# Patient Record
Sex: MALE | URBAN - METROPOLITAN AREA
[De-identification: names, ages, dates, MRNs, and addresses within clinical notes are randomized per-mention and may not be internally consistent; named-entity substitution may affect disease eponyms.]

---

## 2017-06-03 ENCOUNTER — INPATIENT (INPATIENT)
Facility: HOSPITAL | Age: 52
LOS: 1 days | Discharge: HOME | DRG: 976 | End: 2017-06-05
Attending: INTERNAL MEDICINE | Admitting: INTERNAL MEDICINE
Payer: MEDICAID

## 2017-06-03 VITALS
RESPIRATION RATE: 18 BRPM | HEART RATE: 88 BPM | OXYGEN SATURATION: 99 % | DIASTOLIC BLOOD PRESSURE: 87 MMHG | SYSTOLIC BLOOD PRESSURE: 124 MMHG | TEMPERATURE: 98 F

## 2017-06-03 LAB — EXTRA SST TUBE: SIGNIFICANT CHANGE UP

## 2017-06-03 PROCEDURE — 99222 1ST HOSP IP/OBS MODERATE 55: CPT | Mod: GC

## 2017-06-03 PROCEDURE — 99285 EMERGENCY DEPT VISIT HI MDM: CPT

## 2017-06-03 RX ORDER — SODIUM CHLORIDE 9 MG/ML
1000 INJECTION INTRAMUSCULAR; INTRAVENOUS; SUBCUTANEOUS ONCE
Qty: 0 | Refills: 0 | Status: COMPLETED | OUTPATIENT
Start: 2017-06-03 | End: 2017-06-03

## 2017-06-03 RX ORDER — FAMOTIDINE 10 MG/ML
20 INJECTION INTRAVENOUS ONCE
Qty: 0 | Refills: 0 | Status: COMPLETED | OUTPATIENT
Start: 2017-06-03 | End: 2017-06-03

## 2017-06-03 RX ORDER — HEPARIN SODIUM 5000 [USP'U]/ML
5000 INJECTION INTRAVENOUS; SUBCUTANEOUS EVERY 8 HOURS
Qty: 0 | Refills: 0 | Status: DISCONTINUED | OUTPATIENT
Start: 2017-06-03 | End: 2017-06-05

## 2017-06-03 RX ORDER — ONDANSETRON 8 MG/1
4 TABLET, FILM COATED ORAL ONCE
Qty: 0 | Refills: 0 | Status: COMPLETED | OUTPATIENT
Start: 2017-06-03 | End: 2017-06-03

## 2017-06-03 RX ADMIN — SODIUM CHLORIDE 1000 MILLILITER(S): 9 INJECTION INTRAMUSCULAR; INTRAVENOUS; SUBCUTANEOUS at 22:37

## 2017-06-03 RX ADMIN — FAMOTIDINE 20 MILLIGRAM(S): 10 INJECTION INTRAVENOUS at 22:37

## 2017-06-03 RX ADMIN — ONDANSETRON 4 MILLIGRAM(S): 8 TABLET, FILM COATED ORAL at 22:38

## 2017-06-03 NOTE — ED ADULT NURSE NOTE - OBJECTIVE STATEMENT
pt. with hx of HIV presented with c/o "I have infections all over my body". Pt. states he has not taking his hiv meds for 7-8 months, pt. c/o diarrhea for 2 months, cough for a week, pain and rash to arms, pt. is A&Ox3, communicates w/o difficulty, red and white rash noted to both arms. pt. denies chest pain, shortness of breath, n/v, dizziness, weakness, fever, chills.

## 2017-06-03 NOTE — ED PROVIDER NOTE - GENITOURINARY, MLM
External genitalia is normal. External genitalia is normal.  right inguinal hernia reducible  no scrotal swelling  or masses

## 2017-06-03 NOTE — ED PROVIDER NOTE - CARE PLAN
Principal Discharge DX:	Diarrhea  Secondary Diagnosis:	HIV (human immunodeficiency virus infection) Principal Discharge DX:	Diarrhea  Secondary Diagnosis:	HIV (human immunodeficiency virus infection)  Secondary Diagnosis:	Dehydration

## 2017-06-03 NOTE — ED PROVIDER NOTE - MEDICAL DECISION MAKING DETAILS
HIV pos x 32 years--  diarrhea  -dec po intake-  will check labs  hydrate- HIV pos x 32 years--  diarrhea  15 episodes daily x months -off all meds---dec po intake-  will check labs  hydrate-

## 2017-06-03 NOTE — ED PROVIDER NOTE - OBJECTIVE STATEMENT
53 yo male HIV pos substance abuse- meth abuse  x 10 years  sober x 3 months - viral loads T cells ? 75 in the past year c/o of fatigue malaise and diarrhea x 1 week-  decreased krystal intake but still able to eat  no discrete fever or chill was taking Stribald until 7 months ago  no blood in stool 51 yo male HIV pos substance abuse- meth abuse  x 10 years  sober x 3 months - viral loads T cells ? 75 in the past year c/o of fatigue malaise and diarrhea x 2 months  15 episodes a day also c/o of chronic right inguinal hernia pain  decreased krystal intake but still able to eat  no discrete fever or chill was taking Stribald until 7 months ago  no blood in stool

## 2017-06-04 DIAGNOSIS — Z29.9 ENCOUNTER FOR PROPHYLACTIC MEASURES, UNSPECIFIED: ICD-10-CM

## 2017-06-04 DIAGNOSIS — R63.8 OTHER SYMPTOMS AND SIGNS CONCERNING FOOD AND FLUID INTAKE: ICD-10-CM

## 2017-06-04 DIAGNOSIS — Z21 ASYMPTOMATIC HUMAN IMMUNODEFICIENCY VIRUS [HIV] INFECTION STATUS: ICD-10-CM

## 2017-06-04 DIAGNOSIS — D70.9 NEUTROPENIA, UNSPECIFIED: ICD-10-CM

## 2017-06-04 DIAGNOSIS — D63.8 ANEMIA IN OTHER CHRONIC DISEASES CLASSIFIED ELSEWHERE: ICD-10-CM

## 2017-06-04 DIAGNOSIS — B37.0 CANDIDAL STOMATITIS: ICD-10-CM

## 2017-06-04 DIAGNOSIS — R19.7 DIARRHEA, UNSPECIFIED: ICD-10-CM

## 2017-06-04 LAB
ANION GAP SERPL CALC-SCNC: 12 MMOL/L — SIGNIFICANT CHANGE UP (ref 5–17)
BUN SERPL-MCNC: 11 MG/DL — SIGNIFICANT CHANGE UP (ref 7–23)
C DIFF GDH STL QL: NEGATIVE — SIGNIFICANT CHANGE UP
C DIFF GDH STL QL: SIGNIFICANT CHANGE UP
CALCIUM SERPL-MCNC: 8.2 MG/DL — LOW (ref 8.4–10.5)
CHLORIDE SERPL-SCNC: 108 MMOL/L — SIGNIFICANT CHANGE UP (ref 96–108)
CO2 SERPL-SCNC: 18 MMOL/L — LOW (ref 22–31)
CREAT SERPL-MCNC: 0.8 MG/DL — SIGNIFICANT CHANGE UP (ref 0.5–1.3)
FERRITIN SERPL-MCNC: 59.3 NG/ML — SIGNIFICANT CHANGE UP (ref 30–400)
FOLATE SERPL-MCNC: 2.8 NG/ML — LOW (ref 4.8–24.2)
GLUCOSE SERPL-MCNC: 95 MG/DL — SIGNIFICANT CHANGE UP (ref 70–99)
HCT VFR BLD CALC: 34.1 % — LOW (ref 39–50)
HCV AB S/CO SERPL IA: 0.11 S/CO — SIGNIFICANT CHANGE UP
HCV AB SERPL-IMP: SIGNIFICANT CHANGE UP
HGB BLD-MCNC: 11.3 G/DL — LOW (ref 13–17)
IRON SATN MFR SERPL: 14 % — LOW (ref 16–55)
IRON SATN MFR SERPL: 36 UG/DL — LOW (ref 45–165)
MAGNESIUM SERPL-MCNC: 1.9 MG/DL — SIGNIFICANT CHANGE UP (ref 1.6–2.6)
MCHC RBC-ENTMCNC: 30.4 PG — SIGNIFICANT CHANGE UP (ref 27–34)
MCHC RBC-ENTMCNC: 33.1 G/DL — SIGNIFICANT CHANGE UP (ref 32–36)
MCV RBC AUTO: 91.7 FL — SIGNIFICANT CHANGE UP (ref 80–100)
PCP SPEC-MCNC: SIGNIFICANT CHANGE UP
PHOSPHATE SERPL-MCNC: 4 MG/DL — SIGNIFICANT CHANGE UP (ref 2.5–4.5)
PLATELET # BLD AUTO: 359 K/UL — SIGNIFICANT CHANGE UP (ref 150–400)
POTASSIUM SERPL-MCNC: 4.1 MMOL/L — SIGNIFICANT CHANGE UP (ref 3.5–5.3)
POTASSIUM SERPL-SCNC: 4.1 MMOL/L — SIGNIFICANT CHANGE UP (ref 3.5–5.3)
RBC # BLD: 3.72 M/UL — LOW (ref 4.2–5.8)
RBC # FLD: 15.7 % — SIGNIFICANT CHANGE UP (ref 10.3–16.9)
SODIUM SERPL-SCNC: 138 MMOL/L — SIGNIFICANT CHANGE UP (ref 135–145)
TIBC SERPL-MCNC: 257 UG/DL — SIGNIFICANT CHANGE UP (ref 220–430)
UIBC SERPL-MCNC: 221 UG/DL — SIGNIFICANT CHANGE UP (ref 110–370)
VIT B12 SERPL-MCNC: 100 PG/ML — LOW (ref 243–894)
WBC # BLD: 3.2 K/UL — LOW (ref 3.8–10.5)
WBC # FLD AUTO: 3.2 K/UL — LOW (ref 3.8–10.5)

## 2017-06-04 PROCEDURE — 99233 SBSQ HOSP IP/OBS HIGH 50: CPT

## 2017-06-04 RX ORDER — ONDANSETRON 8 MG/1
4 TABLET, FILM COATED ORAL EVERY 6 HOURS
Qty: 0 | Refills: 0 | Status: DISCONTINUED | OUTPATIENT
Start: 2017-06-04 | End: 2017-06-05

## 2017-06-04 RX ORDER — IBUPROFEN 200 MG
400 TABLET ORAL EVERY 4 HOURS
Qty: 0 | Refills: 0 | Status: DISCONTINUED | OUTPATIENT
Start: 2017-06-04 | End: 2017-06-05

## 2017-06-04 RX ORDER — ACETAMINOPHEN 500 MG
650 TABLET ORAL EVERY 6 HOURS
Qty: 0 | Refills: 0 | Status: DISCONTINUED | OUTPATIENT
Start: 2017-06-04 | End: 2017-06-05

## 2017-06-04 RX ORDER — NYSTATIN 500MM UNIT
500000 POWDER (EA) MISCELLANEOUS
Qty: 0 | Refills: 0 | Status: DISCONTINUED | OUTPATIENT
Start: 2017-06-04 | End: 2017-06-05

## 2017-06-04 RX ORDER — SODIUM CHLORIDE 9 MG/ML
1000 INJECTION INTRAMUSCULAR; INTRAVENOUS; SUBCUTANEOUS
Qty: 0 | Refills: 0 | Status: DISCONTINUED | OUTPATIENT
Start: 2017-06-04 | End: 2017-06-05

## 2017-06-04 RX ADMIN — Medication 500000 UNIT(S): at 11:32

## 2017-06-04 RX ADMIN — HEPARIN SODIUM 5000 UNIT(S): 5000 INJECTION INTRAVENOUS; SUBCUTANEOUS at 21:23

## 2017-06-04 RX ADMIN — Medication 400 MILLIGRAM(S): at 10:54

## 2017-06-04 RX ADMIN — Medication 400 MILLIGRAM(S): at 10:24

## 2017-06-04 RX ADMIN — SODIUM CHLORIDE 125 MILLILITER(S): 9 INJECTION INTRAMUSCULAR; INTRAVENOUS; SUBCUTANEOUS at 02:54

## 2017-06-04 RX ADMIN — SODIUM CHLORIDE 125 MILLILITER(S): 9 INJECTION INTRAMUSCULAR; INTRAVENOUS; SUBCUTANEOUS at 19:32

## 2017-06-04 RX ADMIN — Medication 500000 UNIT(S): at 23:35

## 2017-06-04 RX ADMIN — HEPARIN SODIUM 5000 UNIT(S): 5000 INJECTION INTRAVENOUS; SUBCUTANEOUS at 14:14

## 2017-06-04 RX ADMIN — Medication 650 MILLIGRAM(S): at 01:24

## 2017-06-04 RX ADMIN — Medication 650 MILLIGRAM(S): at 02:24

## 2017-06-04 RX ADMIN — HEPARIN SODIUM 5000 UNIT(S): 5000 INJECTION INTRAVENOUS; SUBCUTANEOUS at 06:18

## 2017-06-04 RX ADMIN — Medication 500000 UNIT(S): at 06:18

## 2017-06-04 RX ADMIN — Medication 400 MILLIGRAM(S): at 21:23

## 2017-06-04 RX ADMIN — Medication 500000 UNIT(S): at 17:33

## 2017-06-04 RX ADMIN — Medication 400 MILLIGRAM(S): at 22:23

## 2017-06-04 NOTE — PROGRESS NOTE ADULT - SUBJECTIVE AND OBJECTIVE BOX
CC: Chronic diarrhea. Denies cp, abdominal pain, n/v.   Rest of ROS negative.    Vital Signs Last 24 Hrs  T(C): 36.3, Max: 37.1 (06-04 @ 00:14)  T(F): 97.4, Max: 98.8 (06-04 @ 00:14)  HR: 59 (59 - 88)  BP: 131/89 (106/64 - 138/83)  BP(mean): --  RR: 20 (18 - 20)  SpO2: 96% (96% - 100%)    PHYSICAL EXAMINATION  * General: Not in acute distress. Awake and alert. Lying comfortably in bed.  * Head: Normocephalic, atraumatic.  * HEENT: ears no discharge, eyes PERRLA, nose no discharge, throat no exudates, normal tonsils.  * Neck: no JVD, supple.  * Lungs: Clear to auscultation, no rales, no wheezes.  * Cardio: Regular rate and rhythm, no murmurs, no rubs, no gallops. Good peripheral pulses.  * Abdomen: Soft, non-tender, non-distended, tympanic to percussion, no rebound, no guarding, no rigidity. Bowel sounds present. No suprapubic or CVA tenderness.  * : Deferred.  * Extremities: Acyanotic, no edema.  * Skin: Warm and dry.  * Neuro: Alert and oriented x 3. No focal deficits. Motor strength is 5/5 throughout. Sensation intact. Cranial nerves II-XII grossly intact.                           11.3   3.2   )-----------( 359      ( 04 Jun 2017 07:04 )             34.1   06-03    136  |  104  |  12  ----------------------------<  117<H>  3.9   |  21<L>  |  0.80    Ca    8.9      03 Jun 2017 22:21    TPro  8.0  /  Alb  3.6  /  TBili  0.2  /  DBili  x   /  AST  14  /  ALT  21  /  AlkPhos  82  06-03    MEDICATIONS  (STANDING):  heparin  Injectable 5000Unit(s) SubCutaneous every 8 hours  nystatin    Suspension 425964Tmti(s) Oral four times a day  sodium chloride 0.9%. 1000milliLiter(s) IV Continuous <Continuous>    MEDICATIONS  (PRN):  acetaminophen   Tablet. 650milliGRAM(s) Oral every 6 hours PRN Moderate Pain (4 - 6)  ibuprofen  Tablet 400milliGRAM(s) Oral every 4 hours PRN mild pain  ondansetron Injectable 4milliGRAM(s) IV Push every 6 hours PRN Nausea and/or Vomiting

## 2017-06-04 NOTE — H&P ADULT - ATTENDING COMMENTS
pt seen and examined; reviewed vs, labs   pt a/f diarrhea, abdominal pain in setting of HIV, medication noncompliance  PE  gen: asleep but awoken, dissheveled; frustrated about mattress moving automatically; NAD, thin, younger than stated age  HEENT: perrla, MMM, +thrush on tongue, no JVD  cvs, lungs, abdominal, extremity and derm findings as above  psych: labile mood   a/p:   1. Diarrhea: stool studies pending, follow up blood ctxs; on IVFs  2. HIV: check CD4 and VL along w/ labs noted above; HIV consult pending  3. Thrush: nystatin S&S  4. hx of crystal meth use: stated that he has been clean for the last 3 months; check utox pt seen and examined; reviewed vs, labs   pt a/f diarrhea, abdominal pain in setting of HIV, medication noncompliance  PE  gen: asleep but awoken, dissheveled; frustrated about mattress moving automatically; NAD, thin, younger than stated age  HEENT: perrla, MMM, +thrush on tongue, no JVD  cvs, lungs, abdominal, extremity and derm findings as above  psych: labile mood   a/p:   1. Diarrhea: stool studies pending, follow up blood ctxs; on IVFs  2. HIV: check CD4 and VL along w/ labs noted above; HIV consult pending  3. Thrush: nystatin S&S  4. leukopenia: in setting of HIV, monitor CBC  5. anemia: mild , monitor CBC  6. eosinophilia: check stool studies and Antibodies for parasitic infection  7. hx of crystal meth use: stated that he has been clean for the last 3 months; check utox

## 2017-06-04 NOTE — H&P ADULT - NSHPLABSRESULTS_GEN_ALL_CORE
06-03    136  |  104  |  12  ----------------------------<  117<H>  3.9   |  21<L>  |  0.80    Ca    8.9      03 Jun 2017 22:21    TPro  8.0  /  Alb  3.6  /  TBili  0.2  /  DBili  x   /  AST  14  /  ALT  21  /  AlkPhos  82  06-03                            11.7   3.3   )-----------( 367      ( 03 Jun 2017 22:21 )             35.5             LIVER FUNCTIONS - ( 03 Jun 2017 22:21 )  Alb: 3.6 g/dL / Pro: 8.0 g/dL / ALK PHOS: 82 U/L / ALT: 21 U/L / AST: 14 U/L / GGT: x             PT/INR - ( 03 Jun 2017 22:21 )   PT: 10.8 sec;   INR: 0.97          PTT - ( 03 Jun 2017 22:21 )  PTT:32.2 sec

## 2017-06-04 NOTE — PROGRESS NOTE ADULT - PROBLEM SELECTOR PLAN 1
Unclear etiology. Viral?, bacterial?, fungal?. However in the setting of untreated HIV and likely low CD4 count, consider lymphoma/sarcoma, as well as Cryptosporidium and MAC.   * Need to send stool studies: stool cx, C diff, acid fast, calprotectin, stool na/stool K.  * No abx for now --No SIRS.  * C/w IVF   * Regular diet

## 2017-06-04 NOTE — H&P ADULT - HISTORY OF PRESENT ILLNESS
52M pmhx of HIV not on ARVs who presents with diarrhea and abdominal pain x 2 months.  He says that he has diarrhea, liquid and non-bloody roughly 15 times or more per day.  He has been trying to increase his PO intake and hydration to prevent weight loss but has lost approx 10lbs since last year.  He also has associted abdominal pain in the epigastric and lower abdomen that is crampy and constant without radiation. He is tearful on exam and states that he has had a very difficult year with multiple deaths in the family and because of the increase stress and some abnormal liver chemistries at one of his outpatient follow ups he decided to self DC his Stribild 7 months ago. 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  He also started injecting meth this year, and stopped about 3 months ago. He said he was heavily influenced by his partner to start using drugs and no longer sees him.  In addition, he has a large inguinal hernia on the right which he reduced in front of me on interview.      In ED:  T 98 hR 88 /87 RR 18 O2 99% RA  He was given famotidine, NS 1L and Zofran. 52M pmhx of HIV not on ARVs who presents with diarrhea and abdominal pain x 2 months.  He says that he has diarrhea, liquid and non-bloody roughly 15 times or more per day.  He has been trying to increase his PO intake and hydration to prevent weight loss but has lost approx 10lbs since last year.  He also has associted abdominal pain in the epigastric and lower abdomen that is crampy and constant without radiation.  He denies any nausea or vomiting, fevers or chills associated with this diarrhea. He is tearful on exam and states that he has had a very difficult year with multiple deaths in the family and because of the increase stress and some abnormal liver chemistries at one of his outpatient follow ups he decided to self DC his Stribild 7 months ago. 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  He also started injecting meth this year, and stopped about 3 months ago. He said he was heavily influenced by his partner to start using drugs and no longer sees him.  In addition, he has a large inguinal hernia on the right which he reduced in front of me on interview.      In ED:  T 98 hR 88 /87 RR 18 O2 99% RA  He was given famotidine, NS 1L and Zofran.

## 2017-06-04 NOTE — H&P ADULT - NSHPPHYSICALEXAM_GEN_ALL_CORE
PHYSICAL EXAM:    ICU Vital Signs Last 24 Hrs  T(C): 37.1, Max: 37.1 (06-04 @ 00:14)  T(F): 98.8, Max: 98.8 (06-04 @ 00:14)  HR: 70 (70 - 88)  BP: 134/68 (124/87 - 134/68)  BP(mean): --  ABP: --  ABP(mean): --  RR: 20 (18 - 20)  SpO2: 96% (96% - 99%)    Constitutional: Thin, tearful, non-toxic appearing    Eyes: pink subconjunctiva    ENMT: oral thrush, dry mm    Neck: No JVD    Respiratory: CTAB    Cardiovascular: RRR no murmurs    Gastrointestinal: Soft, non tender to palpation, hyperactive bowel sounds. No rebound or guarding. Right sided large inguinal hernia, reducible with bowel sounds in hernia.     Genitourinary: no gil    Extremities:WWP, both anticubitals have chronic skin thickening and erythema from injection site.  Also has numerous excoriations on b/l arms and ankles.     Skin:as above      Psychiatric: tearful

## 2017-06-04 NOTE — H&P ADULT - PROBLEM SELECTOR PLAN 2
Was previously on Stribild, self D/C'd 7 months ago  -Check CD4 and VL  -Call Dr. Colvin  -Get Collateral from PMD Was previously on Stribild, self D/C'd 7 months ago  -Check CD4 and VL  -Call Dr. Colvin  -Get Collateral from PMD  -Check CMP, lipid panel, syphilis screen, QuantiFeron, GC chlamydia urine, and cbc.

## 2017-06-04 NOTE — H&P ADULT - NSHPSOCIALHISTORY_GEN_ALL_CORE
denies smoking and ETOH  He was injecting meth for the past year up until 3 months ago when he stopped.  Lives in his uncle's apartment on [facility] (uncle ).

## 2017-06-04 NOTE — H&P ADULT - PROBLEM SELECTOR PLAN 1
Differrential for patient with HIV not on ARV with chronic diarrhea is viral vs. bacterial vs. lymphoma/kaposi vs. protozoal vs. fungal vs. aids enteropathy.  Will check stool studies (culture, o/p, C diff, acid fast, calprotectin, stool na/stool K to check osm gap)  and hold off on antibiotics for now.  -Continue IV hydration NS@125mls/hr  -Regular diet Differrential for patient with HIV not on ARV with chronic diarrhea is viral vs. bacterial vs. lymphoma/kaposi vs. protozoal vs. fungal vs. aids enteropathy.  Will check stool studies (culture, o/p, C diff, acid fast, calprotectin, stool na/stool K to check osm gap)  and hold off on antibiotics for now.  -Continue IV hydration NS@125mls/hr  -Regular diet  -F/u blood cultures

## 2017-06-04 NOTE — PROGRESS NOTE ADULT - ASSESSMENT
51yo M, PMH of HIV -not on HAART, p/w chronic diarrhea and inability to keep up with his PO intake. Admitted for dehydration and initiation of work up and therapy

## 2017-06-04 NOTE — PROGRESS NOTE ADULT - PROBLEM SELECTOR PLAN 2
Was previously on Stribild, self D/C'd 7 months ago  * Pending results: CD4 and VL  * Consult Dr. Colvin  * Get Collateral from PMD  * Hep panel negative. Was previously on Stribild, self D/C'd 7 months ago  * Pending results: CD4 and VL  * Consult Dr. Colvin in AM.  * Get Collateral from PMD  * Hep panel negative.

## 2017-06-04 NOTE — H&P ADULT - NSHPREVIEWOFSYSTEMS_GEN_ALL_CORE
REVIEW OF SYSTEMS:    CONSTITUTIONAL: No fever, weight loss, or fatigue  EYES: No eye pain, visual disturbances, or discharge  ENMT:  No difficulty hearing, tinnitus, vertigo; No sinus or throat pain  NECK: No pain or stiffness  BREASTS: No pain, masses, or nipple discharge  RESPIRATORY: No cough, wheezing, chills or hemoptysis; No shortness of breath  CARDIOVASCULAR: No chest pain, palpitations, dizziness, or leg swelling  GASTROINTESTINAL: No abdominal or epigastric pain. No nausea, vomiting, or hematemesis; No diarrhea or constipation. No melena or hematochezia.  GENITOURINARY: No dysuria, frequency, hematuria, or incontinence  NEUROLOGICAL: No headaches, memory loss, loss of strength, numbness, or tremors  SKIN: No itching, burning, rashes, or lesions   LYMPH NODES: No enlarged glands  ENDOCRINE: No heat or cold intolerance; No hair loss  MUSCULOSKELETAL: No joint pain or swelling; No muscle, back, or extremity pain  PSYCHIATRIC: No depression, anxiety, mood swings, or difficulty sleeping  HEME/LYMPH: No easy bruising, or bleeding gums  ALLERY AND IMMUNOLOGIC: No hives or eczema REVIEW OF SYSTEMS:    CONSTITUTIONAL: No fever, weight loss, +fatigue  EYES: No eye pain, visual disturbances, or discharge  ENMT: + thrush on tongue  NECK: No pain or stiffness  RESPIRATORY: No cough, wheezing, chills or hemoptysis; No shortness of breath  CARDIOVASCULAR: No chest pain, palpitations, dizziness, or leg swelling  GASTROINTESTINAL: No abdominal or epigastric pain. No nausea, vomiting, or hematemesis; + diarrhea no constipation. No melena or hematochezia.  GENITOURINARY: No dysuria, frequency, hematuria, or incontinence  NEUROLOGICAL: No headaches, memory loss, loss of strength, numbness, or tremors  SKIN: No itching, burning, rashes, or lesions   LYMPH NODES: No enlarged glands  ENDOCRINE: No heat or cold intolerance; No hair loss  MUSCULOSKELETAL: No joint pain or swelling; No muscle, back, or extremity pain  PSYCHIATRIC: No depression, anxiety, mood swings, or difficulty sleeping  HEME/LYMPH: No easy bruising, or bleeding gums

## 2017-06-05 VITALS
TEMPERATURE: 98 F | DIASTOLIC BLOOD PRESSURE: 82 MMHG | RESPIRATION RATE: 16 BRPM | HEART RATE: 58 BPM | SYSTOLIC BLOOD PRESSURE: 129 MMHG | OXYGEN SATURATION: 99 %

## 2017-06-05 LAB
4/8 RATIO: 0.13 RATIO — SIGNIFICANT CHANGE UP (ref 0.9–3.6)
ABS CD8: 599 /UL — SIGNIFICANT CHANGE UP (ref 136–757)
ANION GAP SERPL CALC-SCNC: 13 MMOL/L — SIGNIFICANT CHANGE UP (ref 5–17)
BUN SERPL-MCNC: 13 MG/DL — SIGNIFICANT CHANGE UP (ref 7–23)
CALCIUM SERPL-MCNC: 8.2 MG/DL — LOW (ref 8.4–10.5)
CD3 BLASTS SPEC-ACNC: 678 /UL — LOW (ref 799–2171)
CD3 BLASTS SPEC-ACNC: 89 % — HIGH (ref 59–85)
CD4 %: 11 % — LOW (ref 36–65)
CD8 %: 79 % — HIGH (ref 11–36)
CHLORIDE SERPL-SCNC: 110 MMOL/L — HIGH (ref 96–108)
CO2 SERPL-SCNC: 16 MMOL/L — LOW (ref 22–31)
CREAT SERPL-MCNC: 0.7 MG/DL — SIGNIFICANT CHANGE UP (ref 0.5–1.3)
GLUCOSE SERPL-MCNC: 88 MG/DL — SIGNIFICANT CHANGE UP (ref 70–99)
HCT VFR BLD CALC: 33 % — LOW (ref 39–50)
HGB BLD-MCNC: 11.6 G/DL — LOW (ref 13–17)
MAGNESIUM SERPL-MCNC: 1.9 MG/DL — SIGNIFICANT CHANGE UP (ref 1.6–2.6)
MCHC RBC-ENTMCNC: 33.1 PG — SIGNIFICANT CHANGE UP (ref 27–34)
MCHC RBC-ENTMCNC: 35.2 G/DL — SIGNIFICANT CHANGE UP (ref 32–36)
MCV RBC AUTO: 94.3 FL — SIGNIFICANT CHANGE UP (ref 80–100)
PLATELET # BLD AUTO: 366 K/UL — SIGNIFICANT CHANGE UP (ref 150–400)
POTASSIUM SERPL-MCNC: 4.1 MMOL/L — SIGNIFICANT CHANGE UP (ref 3.5–5.3)
POTASSIUM SERPL-SCNC: 4.1 MMOL/L — SIGNIFICANT CHANGE UP (ref 3.5–5.3)
POTASSIUM STL-SCNC: 64 MMOL/L — SIGNIFICANT CHANGE UP
RBC # BLD: 3.5 M/UL — LOW (ref 4.2–5.8)
RBC # FLD: 15.6 % — SIGNIFICANT CHANGE UP (ref 10.3–16.9)
SODIUM SERPL-SCNC: 139 MMOL/L — SIGNIFICANT CHANGE UP (ref 135–145)
STRONGYLOIDES AB SER-ACNC: NEGATIVE — SIGNIFICANT CHANGE UP
T PALLIDUM AB TITR SER: NEGATIVE — SIGNIFICANT CHANGE UP
T-CELL CD4 SUBSET PNL BLD: 81 /UL — LOW (ref 489–1457)
WBC # BLD: 4.2 K/UL — SIGNIFICANT CHANGE UP (ref 3.8–10.5)
WBC # FLD AUTO: 4.2 K/UL — SIGNIFICANT CHANGE UP (ref 3.8–10.5)

## 2017-06-05 PROCEDURE — 71010: CPT | Mod: 26

## 2017-06-05 PROCEDURE — 99239 HOSP IP/OBS DSCHRG MGMT >30: CPT

## 2017-06-05 RX ORDER — AZTREONAM 2 G
1 VIAL (EA) INJECTION
Qty: 14 | Refills: 0 | OUTPATIENT
Start: 2017-06-05 | End: 2017-06-19

## 2017-06-05 RX ORDER — FLUCONAZOLE 150 MG/1
1 TABLET ORAL
Qty: 14 | Refills: 0 | OUTPATIENT
Start: 2017-06-05 | End: 2017-06-19

## 2017-06-05 RX ORDER — FOLIC ACID 0.8 MG
1 TABLET ORAL DAILY
Qty: 0 | Refills: 0 | Status: DISCONTINUED | OUTPATIENT
Start: 2017-06-05 | End: 2017-06-05

## 2017-06-05 RX ORDER — PREGABALIN 225 MG/1
1 CAPSULE ORAL
Qty: 30 | Refills: 0 | OUTPATIENT
Start: 2017-06-05 | End: 2017-07-05

## 2017-06-05 RX ORDER — FLUCONAZOLE 150 MG/1
100 TABLET ORAL DAILY
Qty: 0 | Refills: 0 | Status: DISCONTINUED | OUTPATIENT
Start: 2017-06-06 | End: 2017-06-05

## 2017-06-05 RX ORDER — FLUCONAZOLE 150 MG/1
200 TABLET ORAL ONCE
Qty: 0 | Refills: 0 | Status: COMPLETED | OUTPATIENT
Start: 2017-06-05 | End: 2017-06-05

## 2017-06-05 RX ORDER — FOLIC ACID 0.8 MG
1 TABLET ORAL
Qty: 30 | Refills: 0 | OUTPATIENT
Start: 2017-06-05 | End: 2017-07-05

## 2017-06-05 RX ORDER — PREGABALIN 225 MG/1
1000 CAPSULE ORAL DAILY
Qty: 0 | Refills: 0 | Status: DISCONTINUED | OUTPATIENT
Start: 2017-06-05 | End: 2017-06-05

## 2017-06-05 RX ADMIN — Medication 1 MILLIGRAM(S): at 12:37

## 2017-06-05 RX ADMIN — FLUCONAZOLE 200 MILLIGRAM(S): 150 TABLET ORAL at 12:37

## 2017-06-05 RX ADMIN — HEPARIN SODIUM 5000 UNIT(S): 5000 INJECTION INTRAVENOUS; SUBCUTANEOUS at 06:34

## 2017-06-05 RX ADMIN — Medication 1 TABLET(S): at 12:36

## 2017-06-05 RX ADMIN — PREGABALIN 1000 MICROGRAM(S): 225 CAPSULE ORAL at 12:37

## 2017-06-05 RX ADMIN — Medication 500000 UNIT(S): at 06:34

## 2017-06-05 NOTE — CONSULT NOTE ADULT - SUBJECTIVE AND OBJECTIVE BOX
CHIEF COMPLAINT:    Subjective:     Outpatient HIV Provider:  Year of HIV Diagnosis:  T cell jb:  Highest Viral Load:  Current ARV regimen:  ARV adherence:  Previous ARV regimens:  Hx of Past Oppurtunistic Infections:    PAST MEDICAL & SURGICAL HISTORY:  HIV (human immunodeficiency virus infection)      Social Hx:    Sexual History:    Sexual Activity:  Condom Use:  Number of Current Partners:  Number of Lifetime Partners:    REVIEW OF SYSTEMS:      PHYSICAL EXAM:    Vital Signs Last 24 Hrs  T(C): 36.7, Max: 36.9 ( @ 05:25)  T(F): 98, Max: 98.5 ( @ 05:25)  HR: 58 (58 - 61)  BP: 129/82 (117/70 - 129/82)  BP(mean): --  RR: 16 (16 - 19)  SpO2: 99% (95% - 100%)    Physical Exam:      MEDICATIONS  (STANDING):  heparin  Injectable 5000Unit(s) SubCutaneous every 8 hours  nystatin    Suspension 507212Safd(s) Oral four times a day  cyanocobalamin 1000MICROGram(s) Oral daily  folic acid 1milliGRAM(s) Oral daily    MEDICATIONS  (PRN):  acetaminophen   Tablet. 650milliGRAM(s) Oral every 6 hours PRN Moderate Pain (4 - 6)  ibuprofen  Tablet 400milliGRAM(s) Oral every 4 hours PRN mild pain  ondansetron Injectable 4milliGRAM(s) IV Push every 6 hours PRN Nausea and/or Vomiting      Allergies    No Known Allergies    Intolerances      LABS:                        11.6   4.2   )-----------( 366      ( 2017 06:46 )             33.0                           11.6   4.2   )-----------( 366      ( 2017 06:46 )             33.0     Neutrophils:51.4   Bands:--   Lymphocytes:23.7   Monocytes:11.7   Eosinophils:11.4   Basophils:1.8   06-03 @ 22:21    06-05    139  |  110<H>  |  13  ----------------------------<  88  4.1   |  16<L>  |  0.70    Ca    8.2<L>      2017 06:46  Phos  4.0     06-04  Mg     1.9     06-    TPro  8.0  /  Alb  3.6  /  TBili  0.2  /  DBili  x   /  AST  14  /  ALT  21  /  AlkPhos  82  06-03    PT/INR - ( 2017 22:21 )   PT: 10.8 sec;   INR: 0.97          PTT - ( 2017 22:21 )  PTT:32.2 sec  Urinalysis Basic - ( 2017 02:03 )    Color: Yellow / Appearance: Clear / S.025 / pH: x  Gluc: x / Ketone: NEGATIVE  / Bili: NEGATIVE / Urobili: 0.2 E.U./dL   Blood: x / Protein: 30 mg/dL / Nitrite: NEGATIVE   Leuk Esterase: NEGATIVE / RBC: x / WBC < 5 /HPF   Sq Epi: x / Non Sq Epi: Rare /HPF / Bacteria: Present /HPF            MICROBIOLOGY:      RADIOLOGY: CHIEF COMPLAINT: diarrhea    Subjective: Patient is a 52 year old male, [facility] HIV, presenting with chronic diarrhea. Patient states he stopped his ARV treatment 7 months ago when his partner passed away/he had a nervous breakdown.  Two months ago he developed diarrhea.  He has 15-20 episodes per day, non bloody, not associated with food intake. Occasionally he has mild abdominal pain/bloating.  He still has an appetite and tolerates oral intake.  He has lost around 30 pounds over the past year.     Outpatient HIV Provider: none (previously Dr. 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)  Hx of Past Oppurtunistic Infections: none    PAST MEDICAL & SURGICAL HISTORY:  HIV (human immunodeficiency virus infection)      Social Hx:  No smoking  No alcohol use  Currently no drug abuse (used IV Meth one year ago, states has not used since)    Sexual History:    Sexual Activity: currently not sexually active for 7 months  Number of Current Partners: 0  Number of Lifetime Partners: unclear lifetime partners- monogamous for many years with , now     REVIEW OF SYSTEMS:  No fever, chills, n/v, numbness/tingling, rashes, SOB  +diarrhea,       PHYSICAL EXAM:    Vital Signs Last 24 Hrs  T(C): 36.7, Max: 36.9 (06-05 @ 05:25)  T(F): 98, Max: 98.5 (06-05 @ 05:25)  HR: 58 (58 - 61)  BP: 129/82 (117/70 - 129/82)  BP(mean): --  RR: 16 (16 - 19)  SpO2: 99% (95% - 100%)    Physical Exam:  Gen: NAD  HEENT: +oral thrush, PERRL, EOMI, conjunctiva clear  Neck: no LAD, no JVD  CV: RRR, no murmurs  Lungs: CTA b/l, no w/r/r, no tachypnea  Abd: soft, NT, mild distention, +tympany to percussion  : +reducible right sided inguinal hernia, no genital lesions  Ext: no edema, +DP pulses b/l; b/l antecubital fossa: skin thickening, erythematous     MEDICATIONS  (STANDING):  heparin  Injectable 5000Unit(s) SubCutaneous every 8 hours  nystatin    Suspension 656377Eake(s) Oral four times a day  cyanocobalamin 1000MICROGram(s) Oral daily  folic acid 1milliGRAM(s) Oral daily    MEDICATIONS  (PRN):  acetaminophen   Tablet. 650milliGRAM(s) Oral every 6 hours PRN Moderate Pain (4 - 6)  ibuprofen  Tablet 400milliGRAM(s) Oral every 4 hours PRN mild pain  ondansetron Injectable 4milliGRAM(s) IV Push every 6 hours PRN Nausea and/or Vomiting      Allergies    No Known Allergies    Intolerances      LABS:                        11.6   4.2   )-----------( 366      ( 2017 06:46 )             33.0                Neutrophils:51.4   Bands:--   Lymphocytes:23.7   Monocytes:11.7   Eosinophils:11.4   Basophils:1.8   06-03 @ 22:21    06-05    139  |  110<H>  |  13  ----------------------------<  88  4.1   |  16<L>  |  0.70    Ca    8.2<L>      2017 06:46  Phos  4.0     06-04  Mg     1.9     06-05    TPro  8.0  /  Alb  3.6  /  TBili  0.2  /  DBili  x   /  AST  14  /  ALT  21  /  AlkPhos  82  06-03    PT/INR - ( 2017 22:21 )   PT: 10.8 sec;   INR: 0.97          PTT - ( 2017 22:21 )  PTT:32.2 sec  Urinalysis Basic - ( 2017 02:03 )    Color: Yellow / Appearance: Clear / S.025 / pH: x  Gluc: x / Ketone: NEGATIVE  / Bili: NEGATIVE / Urobili: 0.2 E.U./dL   Blood: x / Protein: 30 mg/dL / Nitrite: NEGATIVE   Leuk Esterase: NEGATIVE / RBC: x / WBC < 5 /HPF   Sq Epi: x / Non Sq Epi: Rare /HPF / Bacteria: Present /HPF            MICROBIOLOGY: Blood cultures negative  Stool studies negative to date      RADIOLOGY: CXR- normal CHIEF COMPLAINT: diarrhea    Subjective: Patient is a 52 year old male, [facility] HIV, presenting with chronic diarrhea. Patient states he stopped his ARV treatment 7 months ago when his partner passed away/he had a nervous breakdown.  Two months ago he developed diarrhea.  He has 15-20 episodes per day, non bloody, not associated with food intake. Occasionally he has mild abdominal pain/bloating.  He still has an appetite and tolerates oral intake.  He has lost around 30 pounds over the past year. Denies associated fever, chills, nightsweats, cough, vomiting, rashes.    Outpatient HIV Provider: none (previously Dr. 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)  Hx of Past Oppurtunistic Infections: none    PAST MEDICAL & SURGICAL HISTORY:  HIV (human immunodeficiency virus infection)      Social Hx:  No smoking  No alcohol use  Currently no drug abuse (used IV Meth one year ago, states has not used since)    Sexual History:    Sexual Activity: currently not sexually active for 7 months  Number of Current Partners: 0  Number of Lifetime Partners: unclear lifetime partners- monogamous for many years with , now     REVIEW OF SYSTEMS:  No fever, chills, n/v, numbness/tingling, rashes, SOB  +diarrhea, +bloating      PHYSICAL EXAM:    Vital Signs Last 24 Hrs  T(C): 36.7, Max: 36.9 (-05 @ 05:25)  T(F): 98, Max: 98.5 (- @ 05:25)  HR: 58 (58 - 61)  BP: 129/82 (117/70 - 129/82)  BP(mean): --  RR: 16 (16 - 19)  SpO2: 99% (95% - 100%)    Physical Exam:  Gen: NAD  HEENT: +oral thrush, PERRL, EOMI, conjunctiva clear  Neck: no LAD, no JVD  CV: RRR, no murmurs  Lungs: CTA b/l, no w/r/r, no tachypnea  Abd: soft, NT, mild distention, +tympany to percussion  : +reducible right sided inguinal hernia, no genital lesions  Ext: no edema, +DP pulses b/l; b/l antecubital fossa: skin thickening, erythematous     MEDICATIONS  (STANDING):  heparin  Injectable 5000Unit(s) SubCutaneous every 8 hours  nystatin    Suspension 731842Ggar(s) Oral four times a day  cyanocobalamin 1000MICROGram(s) Oral daily  folic acid 1milliGRAM(s) Oral daily    MEDICATIONS  (PRN):  acetaminophen   Tablet. 650milliGRAM(s) Oral every 6 hours PRN Moderate Pain (4 - 6)  ibuprofen  Tablet 400milliGRAM(s) Oral every 4 hours PRN mild pain  ondansetron Injectable 4milliGRAM(s) IV Push every 6 hours PRN Nausea and/or Vomiting      Allergies    No Known Allergies    Intolerances      LABS:                        11.6   4.2   )-----------( 366      ( 2017 06:46 )             33.0                Neutrophils:51.4   Bands:--   Lymphocytes:23.7   Monocytes:11.7   Eosinophils:11.4   Basophils:1.8   06-03 @ 22:21    06-05    139  |  110<H>  |  13  ----------------------------<  88  4.1   |  16<L>  |  0.70    Ca    8.2<L>      2017 06:46  Phos  4.0     06-04  Mg     1.9     06-05    TPro  8.0  /  Alb  3.6  /  TBili  0.2  /  DBili  x   /  AST  14  /  ALT  21  /  AlkPhos  82  06-03    PT/INR - ( 2017 22:21 )   PT: 10.8 sec;   INR: 0.97          PTT - ( 2017 22:21 )  PTT:32.2 sec  Urinalysis Basic - ( 2017 02:03 )    Color: Yellow / Appearance: Clear / S.025 / pH: x  Gluc: x / Ketone: NEGATIVE  / Bili: NEGATIVE / Urobili: 0.2 E.U./dL   Blood: x / Protein: 30 mg/dL / Nitrite: NEGATIVE   Leuk Esterase: NEGATIVE / RBC: x / WBC < 5 /HPF   Sq Epi: x / Non Sq Epi: Rare /HPF / Bacteria: Present /HPF            MICROBIOLOGY: Blood cultures negative  Stool studies negative to date      RADIOLOGY: CXR- normal

## 2017-06-05 NOTE — DISCHARGE NOTE ADULT - CARE PROVIDER_API CALL
[facility],   [address] [place] [id number]  Phone: [phone]  Fax: (       -

## 2017-06-05 NOTE — DISCHARGE NOTE ADULT - MEDICATION SUMMARY - MEDICATIONS TO TAKE
I will START or STAY ON the medications listed below when I get home from the hospital:    Diflucan 100 mg oral tablet  -- 1 tab(s) by mouth once a day  -- Indication: For Thrush, oral    Bactrim  mg-160 mg oral tablet  -- 1 tab(s) by mouth once a day  -- Indication: For PCP ppx    Vitamin B12 1000 mcg oral tablet  -- 1 tab(s) by mouth once a day  -- Indication: For Vitamin B12 deficiency    folic acid 1 mg oral tablet  -- 1 tab(s) by mouth once a day  -- Indication: For Folic Acid Deficiency

## 2017-06-05 NOTE — DISCHARGE NOTE ADULT - PLAN OF CARE
Medication and follow up You have HIV with unknown CD4 count and VL. You have not been on medication for the past 7 months but need to follow up with our [facility] listed below. Take Bactrim one tab daily for prophylaxis in the meantime. At your clinic visit the results of your CD4 count and VL will be available for you to see. Also take Diflucan 100mg daily for oral candida infection. 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 With compliance in your HIV medications this should improve. In the meantime ensure that you stay hydrated. New You were found to have a deficiency in vitamin B12. Take cyanocobalamin 1000mcg daily. You were found to have folate deficiency. Take folic acid 1mg daily.

## 2017-06-05 NOTE — PROGRESS NOTE ADULT - PROBLEM SELECTOR PLAN 1
Differrential for patient with HIV not on ARV with chronic diarrhea is viral vs. bacterial vs. lymphoma/kaposi vs. protozoal vs. fungal vs. aids enteropathy.  Will check stool studies (culture, o/p, C diff, acid fast, calprotectin, stool na/stool K to check osm gap) and hold off on antibiotics for now.  - Continue IV hydration NS@125ml/hr  - Regular diet  - F/u blood cultures  - Stool studies still pending, C diff negative.

## 2017-06-05 NOTE — DISCHARGE NOTE ADULT - PATIENT PORTAL LINK FT
“You can access the [facility] Patient Portal, offered by [facility], by registering with the following website: [email or web address]”

## 2017-06-05 NOTE — CONSULT NOTE ADULT - ASSESSMENT
Patient is a 52 year old male, PMH HIV, presenting with diarrhea for two months duration after self discontinuing antiretroviral medications seven months ago.

## 2017-06-05 NOTE — PROGRESS NOTE ADULT - ATTENDING COMMENTS
Patient was seen and examined by me at bedside. I agree with resident's note, subjective, objective physical exam, assessment and plan with following modifications/additions.     1) Chronic diarrhea  2) HIV/AIDS --CD4: 81  3) Oral thrush    Plan: Start PCP ppx. Nystatin for hrush. HIV consulted. Recs appreciated. Patient to establish care in HIV clinic with Dr. Colvin.

## 2017-06-05 NOTE — DISCHARGE NOTE ADULT - CARE PLAN
Principal Discharge DX:	HIV (human immunodeficiency virus infection)  Goal:	Medication and follow up  Instructions for follow-up, activity and diet:	You have HIV with unknown CD4 count and VL. You have not been on medication for the past 7 months but need to follow up with our [facility] listed below. Take Bactrim one tab daily for prophylaxis in the meantime. At your clinic visit the results of your CD4 count and VL will be available for you to see. Also take Diflucan 100mg daily for oral candida infection.  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 With compliance in your HIV medications this should improve. In the meantime ensure that you stay hydrated.  Secondary Diagnosis:	Vitamin B12 deficiency  Goal:	New  Instructions for follow-up, activity and diet:	You were found to have a deficiency in vitamin B12. Take cyanocobalamin 1000mcg daily.  Secondary Diagnosis:	Folate deficiency  Goal:	New  Instructions for follow-up, activity and diet:	You were found to have folate deficiency. Take folic acid 1mg daily.

## 2017-06-05 NOTE — PROGRESS NOTE ADULT - SUBJECTIVE AND OBJECTIVE BOX
OVERNIGHT EVENTS: KYLER    SUBJECTIVE: Patient stating that he is getting PCP pneumonia because he feels it. Upon questioning cannot further specify symptoms besides he "knows it's coming." Denies shortness of breath and cough, saying he has a tingly sensation in his chest. Has been having loose bowel movements overnight. No fevers, chills. Tolerating PO.     Vital Signs Last 12 Hrs  T(F): 98.5, Max: 98.5 (06-05 @ 05:25)  HR: 61 (61 - 61)  BP: 120/66 (120/66 - 120/66)  RR: 18 (18 - 18)  SpO2: 95% (95% - 95%)    PHYSICAL EXAM:  Constitutional: NAD, AAOx3, comfortable in bed.   Respiratory: Normal rate, rhythm, depth, effort. CTAB. No w/r/r.   Cardiovascular: RRR, normal S1 and S2, no m/r/g.   Gastrointestinal: +BS, soft NTND.   Extremities: wwp, no edema.   Vascular: Pulses equal and strong throughout.   Neurological: Cranial nerves grossly intact, strength and sensation intact throughout.   Skin: No gross skin abnormalities.     LABS:                        11.6   4.2   )-----------( 366      ( 2017 06:46 )             33.0     06-05    139  |  110<H>  |  13  ----------------------------<  88  4.1   |  16<L>  |  0.70    Ca    8.2<L>      2017 06:46  Phos  4.0     06-04  Mg     1.9     06-05    TPro  8.0  /  Alb  3.6  /  TBili  0.2  /  DBili  x   /  AST  14  /  ALT  21  /  AlkPhos  82  06-03    PT/INR - ( 2017 22:21 )   PT: 10.8 sec;   INR: 0.97        PTT - ( 2017 22:21 )  PTT:32.2 sec    Urinalysis Basic - ( 2017 02:03 )  Color: Yellow / Appearance: Clear / S.025 / pH: x  Gluc: x / Ketone: NEGATIVE  / Bili: NEGATIVE / Urobili: 0.2 E.U./dL   Blood: x / Protein: 30 mg/dL / Nitrite: NEGATIVE   Leuk Esterase: NEGATIVE / RBC: x / WBC < 5 /HPF   Sq Epi: x / Non Sq Epi: Rare /HPF / Bacteria: Present /HPF    MEDICATIONS  (STANDING):  heparin  Injectable 5000Unit(s) SubCutaneous every 8 hours  nystatin    Suspension 221266Nyyf(s) Oral four times a day  sodium chloride 0.9%. 1000milliLiter(s) IV Continuous <Continuous>  cyanocobalamin 1000MICROGram(s) Oral daily  folic acid 1milliGRAM(s) Oral daily    MEDICATIONS  (PRN):  acetaminophen   Tablet. 650milliGRAM(s) Oral every 6 hours PRN Moderate Pain (4 - 6)  ibuprofen  Tablet 400milliGRAM(s) Oral every 4 hours PRN mild pain  ondansetron Injectable 4milliGRAM(s) IV Push every 6 hours PRN Nausea and/or Vomiting

## 2017-06-05 NOTE — PROGRESS NOTE ADULT - PROBLEM SELECTOR PLAN 2
Was previously on Stribild, self D/C'd 7 months ago. With reported CD4 count of 76 however patient very unreliable.   - F/u CD4 and VL  - Dr. Colvin made aware.  - Check CMP, lipid panel, syphilis screen, QuantiFeron, GC chlamydia urine, and cbc.

## 2017-06-05 NOTE — CONSULT NOTE ADULT - ATTENDING COMMENTS
[facility] AIDS patient with chronic diarrhea off ARVs for 7 momths  History of IVDU- last use ? Claims years ago  RAJI  so far negative- patient in a rush to leave  Promises FU as outpatient  Appointment given [facility] AIDS patient with chronic diarrhea off ARVs for 7 momths  History of IVDU- last use ? Claims years ago  RAJI  so far negative- patient in a rush to leave- I encountered him in the hallway on my way to see him  Promises FU as outpatient  Appointment given

## 2017-06-05 NOTE — PROGRESS NOTE ADULT - ASSESSMENT
51 yo M with HIV (reportedly last CD4 count 76 but unreliable) and not on ARV for past 7 months who is here with diarrhea for past two months.
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
## 2017-06-05 NOTE — DISCHARGE NOTE ADULT - PROVIDER TOKENS
FREE:[LAST:[[facility]],PHONE:[phone]],FAX:[(   [phone]    -],ADDRESS:[[address] [place] [address]]]

## 2017-06-05 NOTE — DISCHARGE NOTE ADULT - HOSPITAL COURSE
52M with HIV not on ARVs who presents with diarrhea and abdominal pain x 2 months. He says that he has diarrhea, liquid and non-bloody roughly 15 times or more per day. He has been trying to increase his PO intake and hydration to prevent weight loss but has lost approx 10lbs since last year. He was previously on HAART therapy but decided to self DC his Stribild 7 months ago. He reportedly obtained HIV as a child from a blood transfusion and says he was a "non converter" until 2000 when he began taking ARVs and was very compliant until recently. He also started injecting meth this year, and stopped about 3 months ago. T 98 HR 88 /87 RR 18 O2 99% RA  Vit B12 and folate low, started replacement therapy. Discontinued IVF as patient was able to tolerate PO with less frequent bouts of diarrhea. CXR clear. Started on diflucan for oral thrush and started on bactrim for pcp ppx as CD4 and VL unknown. Was able to be discharged with appropriate follow up at [facility] and with a 14 day supply of diflucan and bactrim.

## 2017-06-06 LAB
C TRACH RRNA SPEC QL NAA+PROBE: SIGNIFICANT CHANGE UP
CULTURE RESULTS: SIGNIFICANT CHANGE UP
N GONORRHOEA RRNA SPEC QL NAA+PROBE: SIGNIFICANT CHANGE UP
NIGHT BLUE STAIN TISS: SIGNIFICANT CHANGE UP
SPECIMEN SOURCE: SIGNIFICANT CHANGE UP

## 2017-06-07 DIAGNOSIS — K40.90 UNILATERAL INGUINAL HERNIA, WITHOUT OBSTRUCTION OR GANGRENE, NOT SPECIFIED AS RECURRENT: ICD-10-CM

## 2017-06-07 DIAGNOSIS — B37.0 CANDIDAL STOMATITIS: ICD-10-CM

## 2017-06-07 DIAGNOSIS — K52.89 OTHER SPECIFIED NONINFECTIVE GASTROENTERITIS AND COLITIS: ICD-10-CM

## 2017-06-07 DIAGNOSIS — D64.9 ANEMIA, UNSPECIFIED: ICD-10-CM

## 2017-06-07 DIAGNOSIS — E86.0 DEHYDRATION: ICD-10-CM

## 2017-06-07 DIAGNOSIS — E53.8 DEFICIENCY OF OTHER SPECIFIED B GROUP VITAMINS: ICD-10-CM

## 2017-06-07 DIAGNOSIS — R19.7 DIARRHEA, UNSPECIFIED: ICD-10-CM

## 2017-06-07 DIAGNOSIS — D72.1 EOSINOPHILIA: ICD-10-CM

## 2017-06-07 DIAGNOSIS — B20 HUMAN IMMUNODEFICIENCY VIRUS [HIV] DISEASE: ICD-10-CM

## 2017-06-07 LAB
CULTURE RESULTS: SIGNIFICANT CHANGE UP
HIV1 RNA # SERPL NAA+PROBE: SIGNIFICANT CHANGE UP
HIV1 RNA SERPL NAA+PROBE-LOG#: 5.47 LG10COP/ML — SIGNIFICANT CHANGE UP
M TB TUBERC IFN-G BLD QL: -0.01 IU/ML — SIGNIFICANT CHANGE UP
M TB TUBERC IFN-G BLD QL: 0.07 IU/ML — SIGNIFICANT CHANGE UP
M TB TUBERC IFN-G BLD QL: NEGATIVE — SIGNIFICANT CHANGE UP
MITOGEN IGNF BCKGRD COR BLD-ACNC: 1.26 IU/ML — SIGNIFICANT CHANGE UP
SPECIMEN SOURCE: SIGNIFICANT CHANGE UP

## 2017-06-08 LAB
ABACAVIR ISLT GENOTYP: SIGNIFICANT CHANGE UP
ATAZANAVIR+RITONAVIR ISLT GENOTYP: SIGNIFICANT CHANGE UP
CULTURE RESULTS: SIGNIFICANT CHANGE UP
DARUNAVIR+RITONAVIR ISLT GENOTYP: SIGNIFICANT CHANGE UP
DIDANOSINE ISLT GENOTYP: SIGNIFICANT CHANGE UP
EFAVIRENZ ISLT GENOTYP: SIGNIFICANT CHANGE UP
EMTRICITABINE ISLT GENOTYP: SIGNIFICANT CHANGE UP
ETRAVIRINE ISLT GENOTYP: SIGNIFICANT CHANGE UP
FOSAMPRENAVIR+RITONAVIR ISLT GENOTYP: SIGNIFICANT CHANGE UP
HIV NNRTI GENE MUT DET ISLT: SIGNIFICANT CHANGE UP
HIV NRTI GENE MUT DET ISLT: SIGNIFICANT CHANGE UP
HIV PI GENE MUT DET ISLT: SIGNIFICANT CHANGE UP
HIV RT+PR MUT TESTED ISLT: SIGNIFICANT CHANGE UP
INDINAVIR+RITONAVIR ISLT GENOTYP: SIGNIFICANT CHANGE UP
LAMIVUDINE ISLT GENOTYP: SIGNIFICANT CHANGE UP
LOPINAVIR+RITONAVIR ISLT GENOTYP: SIGNIFICANT CHANGE UP
NELFINAVIR ISLT GENOTYP: SIGNIFICANT CHANGE UP
NEVIRAPINE ISLT GENOTYP: SIGNIFICANT CHANGE UP
RILPIVIRINE ISLT GENOTYP: SIGNIFICANT CHANGE UP
SAQUINAVIR+RITONAVIR ISLT GENOTYP: SIGNIFICANT CHANGE UP
SPECIMEN SOURCE: SIGNIFICANT CHANGE UP
STAVUDINE ISLT GENOTYP: SIGNIFICANT CHANGE UP
TENOFOVIR ISLT GENOTYP: SIGNIFICANT CHANGE UP
TIPRANAVIR+RITONAVIR ISLT GENOTYP: SIGNIFICANT CHANGE UP
ZIDOVUDINE ISLT GENOTYP: SIGNIFICANT CHANGE UP

## 2017-06-09 LAB
CULTURE RESULTS: SIGNIFICANT CHANGE UP
CULTURE RESULTS: SIGNIFICANT CHANGE UP
SPECIMEN SOURCE: SIGNIFICANT CHANGE UP
SPECIMEN SOURCE: SIGNIFICANT CHANGE UP

## 2017-07-19 LAB
CULTURE RESULTS: SIGNIFICANT CHANGE UP
SPECIMEN SOURCE: SIGNIFICANT CHANGE UP

## 2017-07-23 PROCEDURE — 87206 SMEAR FLUORESCENT/ACID STAI: CPT

## 2017-07-23 PROCEDURE — 87177 OVA AND PARASITES SMEARS: CPT

## 2017-07-23 PROCEDURE — 87045 FECES CULTURE AEROBIC BACT: CPT

## 2017-07-23 PROCEDURE — 71045 X-RAY EXAM CHEST 1 VIEW: CPT

## 2017-07-23 PROCEDURE — 85025 COMPLETE CBC W/AUTO DIFF WBC: CPT

## 2017-07-23 PROCEDURE — 87040 BLOOD CULTURE FOR BACTERIA: CPT

## 2017-07-23 PROCEDURE — 80048 BASIC METABOLIC PNL TOTAL CA: CPT

## 2017-07-23 PROCEDURE — 87046 STOOL CULTR AEROBIC BACT EA: CPT

## 2017-07-23 PROCEDURE — 86803 HEPATITIS C AB TEST: CPT

## 2017-07-23 PROCEDURE — 87536 HIV-1 QUANT&REVRSE TRNSCRPJ: CPT

## 2017-07-23 PROCEDURE — 96374 THER/PROPH/DIAG INJ IV PUSH: CPT

## 2017-07-23 PROCEDURE — 84999 UNLISTED CHEMISTRY PROCEDURE: CPT

## 2017-07-23 PROCEDURE — 87116 MYCOBACTERIA CULTURE: CPT

## 2017-07-23 PROCEDURE — 83690 ASSAY OF LIPASE: CPT

## 2017-07-23 PROCEDURE — 85730 THROMBOPLASTIN TIME PARTIAL: CPT

## 2017-07-23 PROCEDURE — 85610 PROTHROMBIN TIME: CPT

## 2017-07-23 PROCEDURE — 81001 URINALYSIS AUTO W/SCOPE: CPT

## 2017-07-23 PROCEDURE — 86780 TREPONEMA PALLIDUM: CPT

## 2017-07-23 PROCEDURE — 82746 ASSAY OF FOLIC ACID SERUM: CPT

## 2017-07-23 PROCEDURE — 80053 COMPREHEN METABOLIC PANEL: CPT

## 2017-07-23 PROCEDURE — 86359 T CELLS TOTAL COUNT: CPT

## 2017-07-23 PROCEDURE — 84100 ASSAY OF PHOSPHORUS: CPT

## 2017-07-23 PROCEDURE — 99285 EMERGENCY DEPT VISIT HI MDM: CPT | Mod: 25

## 2017-07-23 PROCEDURE — 83735 ASSAY OF MAGNESIUM: CPT

## 2017-07-23 PROCEDURE — 96375 TX/PRO/DX INJ NEW DRUG ADDON: CPT

## 2017-07-23 PROCEDURE — 82728 ASSAY OF FERRITIN: CPT

## 2017-07-23 PROCEDURE — 80307 DRUG TEST PRSMV CHEM ANLYZR: CPT

## 2017-07-23 PROCEDURE — 86682 HELMINTH ANTIBODY: CPT

## 2017-07-23 PROCEDURE — 87015 SPECIMEN INFECT AGNT CONCNTJ: CPT

## 2017-07-23 PROCEDURE — 82607 VITAMIN B-12: CPT

## 2017-07-23 PROCEDURE — 83550 IRON BINDING TEST: CPT

## 2017-07-23 PROCEDURE — 36415 COLL VENOUS BLD VENIPUNCTURE: CPT

## 2017-07-23 PROCEDURE — 85027 COMPLETE CBC AUTOMATED: CPT

## 2017-07-23 PROCEDURE — 86784 TRICHINELLA ANTIBODY: CPT

## 2019-11-22 NOTE — PROGRESS NOTE ADULT - PROBLEM/PLAN-4
----- Message from Geovani [email or web address] sent at 11/22/2019  8:26 AM CST -----  Contact: Pt's wife      The caller states that the Pt has 90% blockage in the groan on the left side and also about 70% blockage on the right side but not as far up in the groan area.  The caller states that the Pt has had every test and imaging showing everything that is going on.  The caller states that it's on the Pt's My Chart.  The issue they are having is that the surgeon is booked up until December.  The caller also has everything on a disc.  Please contact the caller to discuss getting in for the first available appt.    Phone # [phone]   DISPLAY PLAN FREE TEXT
